# Patient Record
Sex: MALE | Race: OTHER
[De-identification: names, ages, dates, MRNs, and addresses within clinical notes are randomized per-mention and may not be internally consistent; named-entity substitution may affect disease eponyms.]

---

## 2022-04-13 ENCOUNTER — APPOINTMENT (OUTPATIENT)
Dept: ORTHOPEDIC SURGERY | Facility: CLINIC | Age: 57
End: 2022-04-13

## 2022-04-14 ENCOUNTER — APPOINTMENT (OUTPATIENT)
Dept: ORTHOPEDIC SURGERY | Facility: CLINIC | Age: 57
End: 2022-04-14

## 2022-05-19 ENCOUNTER — APPOINTMENT (OUTPATIENT)
Dept: ORTHOPEDIC SURGERY | Facility: CLINIC | Age: 57
End: 2022-05-19
Payer: COMMERCIAL

## 2022-05-19 VITALS — WEIGHT: 150 LBS | BODY MASS INDEX: 24.99 KG/M2 | HEIGHT: 65 IN

## 2022-05-19 DIAGNOSIS — M25.461 EFFUSION, RIGHT KNEE: ICD-10-CM

## 2022-05-19 DIAGNOSIS — Z78.9 OTHER SPECIFIED HEALTH STATUS: ICD-10-CM

## 2022-05-19 DIAGNOSIS — M25.561 PAIN IN RIGHT KNEE: ICD-10-CM

## 2022-05-19 PROCEDURE — 20610 DRAIN/INJ JOINT/BURSA W/O US: CPT

## 2022-05-19 PROCEDURE — 99214 OFFICE O/P EST MOD 30 MIN: CPT | Mod: 25

## 2022-05-19 PROCEDURE — 73560 X-RAY EXAM OF KNEE 1 OR 2: CPT | Mod: RT

## 2022-05-19 PROCEDURE — 73565 X-RAY EXAM OF KNEES: CPT

## 2022-05-19 RX ORDER — CARBIDOPA AND LEVODOPA 25; 100 MG/1; MG/1
25-100 TABLET ORAL
Refills: 0 | Status: ACTIVE | COMMUNITY

## 2022-05-19 RX ORDER — NAPROXEN 500 MG/1
500 TABLET ORAL
Refills: 0 | Status: ACTIVE | COMMUNITY

## 2022-05-19 RX ORDER — SELEGILINE HYDROCHLORIDE 5 MG/1
5 CAPSULE ORAL
Refills: 0 | Status: ACTIVE | COMMUNITY

## 2022-05-19 RX ORDER — AMANTADINE HYDROCHLORIDE 100 1/1
100 TABLET ORAL
Refills: 0 | Status: ACTIVE | COMMUNITY

## 2022-05-19 RX ORDER — DICLOFENAC SODIUM 75 MG/1
75 TABLET, DELAYED RELEASE ORAL
Qty: 60 | Refills: 1 | Status: ACTIVE | COMMUNITY
Start: 2022-05-19 | End: 1900-01-01

## 2022-05-19 NOTE — PROCEDURE
[Large Joint Injection] : Large joint injection [Right] : of the right [Knee] : knee [FreeTextEntry3] : Large Joint Injection was performed because of pain and inflammation. Anesthesia: ethyl chloride sprayed topically.. aspirated 30 cc clear yellow fluid\par Decadron: An injection of Decadron 4 mg , \par Kenalog: An injection of Kenalog 5 mg , \par Lidocaine: 2 cc. \par \par Medication was injected in the right knee. Patient has tried OTC's including aspirin, Ibuprofen, Aleve etc or prescription NSAIDS, and/or exercises at home and/ or physical therapy without satisfactory response and Patient has decreased mobility in the joint. After verbal consent using sterile preparation and technique. The risks, benefits, and alternatives to cortisone injection were explained in full to the patient. Risks outlined include but are not limited to infection, sepsis, bleeding, scarring, skin discoloration, temporary increase in pain, syncopal episode, failure to resolve symptoms, allergic reaction, symptom recurrence, and elevation of blood sugar in diabetics. Patient understood the risks. All questions were answered. After discussion of options, patient requested an injection. Oral informed consent was obtained and sterile prep was done of the injection site. Sterile technique was utilized for the procedure including the preparation of the solutions used for the injection. Patient tolerated the procedure well. Advised to ice the injection site this evening. Prep with betadine locally to site. Sterile technique used and Prep with alcohol locally to site. Sterile technique used. Patient tolerated procedure well. Post Procedure Instructions: Patient was advised to call if redness, pain, or fever occur and apply ice for 15 min. out of every hour for the next 12-24 hours as tolerated. patient was advised to rest the joint(s) for 1-2 days.\par \par

## 2022-05-19 NOTE — HISTORY OF PRESENT ILLNESS
[de-identified] : Right knee pain increased and for 3 months of increased pain and some swelling. no new injuries or falls. . Orthovisc in fall helpped for a few weeks. effects sleep . pain on stairs and more going up. Had no knee surgery had gels in past .  works in [employer] . no injuries.

## 2022-05-19 NOTE — PHYSICAL EXAM
[Lateral] : lateral [[place]] : skyline [AP Standing] : anteroposterior standing [advanced tricompartmental OA with medial compartment narrowing and varus alignment] : advanced tricompartmental OA with medial compartment narrowing and varus alignment [Mild patellofemoral OA] : Mild patellofemoral OA [Left] : left knee [There are no fractures, subluxations or dislocations. No significant abnormalities are seen] : There are no fractures, subluxations or dislocations. No significant abnormalities are seen [Right] : right knee [NL (0)] : extension 0 degrees [5___] : hamstring 5[unfilled]/5 [Negative] : negative Fran's [] : negative Valgus instability [TWNoteComboBox7] : flexion 125 degrees

## 2022-05-19 NOTE — DISCUSSION/SUMMARY
[Medication Risks Reviewed] : Medication risks reviewed [Surgical risks reviewed] : Surgical risks reviewed [de-identified] : discussed at length the need for a total knee replacement. 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 alternatives of trying again orthovisc but last series did not help much. all discussed.\par He is going away in June and does know if cortisone is injected today he cannot have a total knee for 3 months. He and his wife understand.

## 2022-06-09 ENCOUNTER — APPOINTMENT (OUTPATIENT)
Dept: ORTHOPEDIC SURGERY | Facility: CLINIC | Age: 57
End: 2022-06-09
Payer: COMMERCIAL

## 2022-06-09 DIAGNOSIS — G20 PARKINSON'S DISEASE: ICD-10-CM

## 2022-06-09 DIAGNOSIS — M17.11 UNILATERAL PRIMARY OSTEOARTHRITIS, RIGHT KNEE: ICD-10-CM

## 2022-06-09 PROCEDURE — 99215 OFFICE O/P EST HI 40 MIN: CPT

## 2022-06-09 NOTE — DISCUSSION/SUMMARY
[Medication Risks Reviewed] : Medication risks reviewed [Surgical risks reviewed] : Surgical risks reviewed [de-identified] : discussed at length the need for a total knee replacement. 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 alternatives of trying again orthovisc but last series did not help much. all discussed.\par He is going away in June and does know if cortisone is injected today he cannot have a total knee for 3 months. He and his wife understand.

## 2022-06-09 NOTE — PHYSICAL EXAM
[NL (0)] : extension 0 degrees [5___] : hamstring 5[unfilled]/5 [Negative] : negative Fran's [Right] : right knee [Lateral] : lateral [[place]] : skyline [AP Standing] : anteroposterior standing [advanced tricompartmental OA with medial compartment narrowing and varus alignment] : advanced tricompartmental OA with medial compartment narrowing and varus alignment [Mild patellofemoral OA] : Mild patellofemoral OA [Left] : left knee [There are no fractures, subluxations or dislocations. No significant abnormalities are seen] : There are no fractures, subluxations or dislocations. No significant abnormalities are seen [] : negative Valgus instability [TWNoteComboBox7] : flexion 125 degrees

## 2022-06-09 NOTE — HISTORY OF PRESENT ILLNESS
[de-identified] : Right knee pain increased and for 3 months of increased pain and some swelling. no new injuries or falls. . Orthovisc in fall helpped for a few weeks. effects sleep . pain on stairs and more going up. Had no knee surgery had gels in past .  works in [employer] . no injuries.

## 2022-06-09 NOTE — ASSESSMENT
[FreeTextEntry1] : 56 year M WITH MODERATE RT KNEE PAIN WITHOUT INJURY. HAD INJECTIONS IN THE PAST WHICH HELPED. PAIN WORSENS WITH STAIRS AND WALKING PROLONGED DISTANCES. PAIN IS AFFECTING ADL AND FUNCTIONAL ACTIVITIES. TREATMENT OPTIONS REVIEWED. DISCUSSED TKA AT LENGTH. HE WILL GET CLEARANCE FROM CARDIOLOGY/NEUROLOGY.\par \par NO METAL ALLERGIES\par PMHx: PARKINSON'S DISEASE\par NO DVT/PE\par NO H/O DM, HTN \par \par \par RT TKA - CONFORMIS PROTOCOL\par \par DISCUSSED R&B OF CONFORMIS TKA. WILL ORDER CT TO EVAL FOR BONE LOSS AND DEFORMITY FOR PRE-OP PLANNING. \par \par \par \par The patient was advised of the diagnosis. The natural history of the pathology was  explained in full to the patient in layman's terms. All questions were answered. The risks\par and benefits of surgical and non-surgical treatment alternatives were explained in full the patient. \par \par The risks of the procedure, including but not limited to infection, bleeding, anesthetic complication, neurovas injury and the possibility of subsequent surgery were discussed the benefits, non-operative alternatives and expectations of the proposed procedure were also discussed. Post-operative management, the procedure its\par and the expected recovery period were discussed at length with the patient. The need for post-operative physical therapy and home exercise regimen, possible bracing and medication management were also discuss Informed consent was obtained.\par \par The risks and benefits of surgery have been discussed. Risks include but are not limited to bleeding, infection, reaction to anesthesia, injury to blood vessels and nerves, malunion, nonunion, DVT, PE, necessity of repeat surgery chronic pain, loss of limb and death. The patient understands the risks and agrees with the surgical plan. All questions have been answered.\par \par We discussed my findings and the natural history of their condition. We talked about the details of the proposed surgery and the recovery. We discussed the material risks, possible benefits and alternatives to surgery. The risks include but are not limited to infection, bleeding and possible need for blood transfusion, fracture, bowel blockage, bladder retention or infection, need for reoperation, stiffness and/or limited range of motion, possible damage to nerves and blood vessels, failure of fixation of components, risk of deep vein thromboses and pulmonary embolism, wound healing problems, dislocation, and possible leg length discrepancy. Although incredibly rare, we also discussed the risks of a cardiac event, stroke and even death during, or following, the surgery. We discussed the type of implants the patient will be receiving and the type of fixation that will be used, as well as whether a robot or computer navigation aide will be used. The patient understands they will need medical clearance and will attend a preoperative joint education class. We also discussed the type of anesthesia they will receive, and the risks associated with hospital or rehab length of stay, obesity, diabetes and smoking.\par \par Patient Complains of pain in the affected joint with a level that often reaches greater than a 8/10. The Pain has been progressively worsening of his/her treatment coarse. The pain has interfered with their ADLs and worsens with weight bearing. On exam they often have episodes of swelling/effusion with limited ROM. Pain worsens with ROM passive and active and I can palpate crepitus. \par X- rays were reviewed with the patient and they show joint space narrowing, subchondral sclerosis, osteophyte formation, and subchondral cysts.\par After a period of more than 12 weeks physical therapy or exercise program done with me or another treating physician they have continued pain. The patient has failed a trial of NSAID medication or pain relieves if they were unable to tolerate NSAID medications as well as a series of injection, steroid or Hyaluronic Acid. After a long discussion with the patient both the patient and I have decided we have exhausted all forms of less radical treatments and they would like to proceed with Total Joint Replacement.

## 2022-07-18 ENCOUNTER — FORM ENCOUNTER (OUTPATIENT)
Age: 57
End: 2022-07-18

## 2022-09-08 ENCOUNTER — OUTPATIENT (OUTPATIENT)
Dept: OUTPATIENT SERVICES | Facility: HOSPITAL | Age: 57
LOS: 1 days | Discharge: HOME | End: 2022-09-08

## 2022-09-08 VITALS
WEIGHT: 137.35 LBS | SYSTOLIC BLOOD PRESSURE: 131 MMHG | HEIGHT: 65 IN | DIASTOLIC BLOOD PRESSURE: 88 MMHG | HEART RATE: 71 BPM | OXYGEN SATURATION: 99 % | RESPIRATION RATE: 17 BRPM | TEMPERATURE: 98 F

## 2022-09-08 DIAGNOSIS — M17.11 UNILATERAL PRIMARY OSTEOARTHRITIS, RIGHT KNEE: ICD-10-CM

## 2022-09-08 DIAGNOSIS — Z86.69 PERSONAL HISTORY OF OTHER DISEASES OF THE NERVOUS SYSTEM AND SENSE ORGANS: ICD-10-CM

## 2022-09-08 DIAGNOSIS — Z01.818 ENCOUNTER FOR OTHER PREPROCEDURAL EXAMINATION: ICD-10-CM

## 2022-09-08 DIAGNOSIS — Z96.89 PRESENCE OF OTHER SPECIFIED FUNCTIONAL IMPLANTS: Chronic | ICD-10-CM

## 2022-09-08 LAB
A1C WITH ESTIMATED AVERAGE GLUCOSE RESULT: 5.5 % — SIGNIFICANT CHANGE UP (ref 4–5.6)
ALBUMIN SERPL ELPH-MCNC: 4.2 G/DL — SIGNIFICANT CHANGE UP (ref 3.3–5)
ALP SERPL-CCNC: 79 U/L — SIGNIFICANT CHANGE UP (ref 40–120)
ALT FLD-CCNC: 20 U/L — SIGNIFICANT CHANGE UP (ref 12–78)
ANION GAP SERPL CALC-SCNC: 4 MMOL/L — LOW (ref 5–17)
APTT BLD: 32.8 SEC — SIGNIFICANT CHANGE UP (ref 27.5–35.5)
AST SERPL-CCNC: 21 U/L — SIGNIFICANT CHANGE UP (ref 15–37)
BASOPHILS # BLD AUTO: 0.06 K/UL — SIGNIFICANT CHANGE UP (ref 0–0.2)
BASOPHILS NFR BLD AUTO: 0.9 % — SIGNIFICANT CHANGE UP (ref 0–2)
BILIRUB SERPL-MCNC: 0.5 MG/DL — SIGNIFICANT CHANGE UP (ref 0.2–1.2)
BLD GP AB SCN SERPL QL: SIGNIFICANT CHANGE UP
BUN SERPL-MCNC: 22 MG/DL — SIGNIFICANT CHANGE UP (ref 7–23)
CALCIUM SERPL-MCNC: 9.4 MG/DL — SIGNIFICANT CHANGE UP (ref 8.5–10.1)
CHLORIDE SERPL-SCNC: 108 MMOL/L — SIGNIFICANT CHANGE UP (ref 96–108)
CO2 SERPL-SCNC: 28 MMOL/L — SIGNIFICANT CHANGE UP (ref 22–31)
CREAT SERPL-MCNC: 0.95 MG/DL — SIGNIFICANT CHANGE UP (ref 0.5–1.3)
EGFR: 95 ML/MIN/1.73M2 — SIGNIFICANT CHANGE UP
EOSINOPHIL # BLD AUTO: 0.22 K/UL — SIGNIFICANT CHANGE UP (ref 0–0.5)
EOSINOPHIL NFR BLD AUTO: 3.1 % — SIGNIFICANT CHANGE UP (ref 0–6)
ESTIMATED AVERAGE GLUCOSE: 111 MG/DL — SIGNIFICANT CHANGE UP (ref 68–114)
GLUCOSE SERPL-MCNC: 85 MG/DL — SIGNIFICANT CHANGE UP (ref 70–99)
HCT VFR BLD CALC: 49 % — SIGNIFICANT CHANGE UP (ref 39–50)
HGB BLD-MCNC: 16.1 G/DL — SIGNIFICANT CHANGE UP (ref 13–17)
IMM GRANULOCYTES NFR BLD AUTO: 0.3 % — SIGNIFICANT CHANGE UP (ref 0–1.5)
INR BLD: 1.01 RATIO — SIGNIFICANT CHANGE UP (ref 0.88–1.16)
LYMPHOCYTES # BLD AUTO: 1.49 K/UL — SIGNIFICANT CHANGE UP (ref 1–3.3)
LYMPHOCYTES # BLD AUTO: 21.2 % — SIGNIFICANT CHANGE UP (ref 13–44)
MCHC RBC-ENTMCNC: 31.1 PG — SIGNIFICANT CHANGE UP (ref 27–34)
MCHC RBC-ENTMCNC: 32.9 G/DL — SIGNIFICANT CHANGE UP (ref 32–36)
MCV RBC AUTO: 94.6 FL — SIGNIFICANT CHANGE UP (ref 80–100)
MONOCYTES # BLD AUTO: 0.59 K/UL — SIGNIFICANT CHANGE UP (ref 0–0.9)
MONOCYTES NFR BLD AUTO: 8.4 % — SIGNIFICANT CHANGE UP (ref 2–14)
MRSA PCR RESULT.: SIGNIFICANT CHANGE UP
NEUTROPHILS # BLD AUTO: 4.65 K/UL — SIGNIFICANT CHANGE UP (ref 1.8–7.4)
NEUTROPHILS NFR BLD AUTO: 66.1 % — SIGNIFICANT CHANGE UP (ref 43–77)
NRBC # BLD: 0 /100 WBCS — SIGNIFICANT CHANGE UP (ref 0–0)
PLATELET # BLD AUTO: 295 K/UL — SIGNIFICANT CHANGE UP (ref 150–400)
POTASSIUM SERPL-MCNC: 3.7 MMOL/L — SIGNIFICANT CHANGE UP (ref 3.5–5.3)
POTASSIUM SERPL-SCNC: 3.7 MMOL/L — SIGNIFICANT CHANGE UP (ref 3.5–5.3)
PROT SERPL-MCNC: 7.6 GM/DL — SIGNIFICANT CHANGE UP (ref 6–8.3)
PROTHROM AB SERPL-ACNC: 12 SEC — SIGNIFICANT CHANGE UP (ref 10.5–13.4)
RBC # BLD: 5.18 M/UL — SIGNIFICANT CHANGE UP (ref 4.2–5.8)
RBC # FLD: 13.2 % — SIGNIFICANT CHANGE UP (ref 10.3–14.5)
S AUREUS DNA NOSE QL NAA+PROBE: SIGNIFICANT CHANGE UP
SODIUM SERPL-SCNC: 140 MMOL/L — SIGNIFICANT CHANGE UP (ref 135–145)
WBC # BLD: 7.03 K/UL — SIGNIFICANT CHANGE UP (ref 3.8–10.5)
WBC # FLD AUTO: 7.03 K/UL — SIGNIFICANT CHANGE UP (ref 3.8–10.5)

## 2022-09-08 NOTE — H&P PST ADULT - PROBLEM SELECTOR PLAN 2
Deep brain stimulator in place  on Sinemet, Selegiline, Amantadine  Continue current regimen and medications.   neuro clearance

## 2022-09-08 NOTE — H&P PST ADULT - PROBLEM SELECTOR PLAN 1
Right total knee replacement  labs - cbc,pt/ptt,bmp,t&s,nose cx,ekg  M/C required  cardiac clearance  preop 3 day hibiclens instruction reviewed and given .instructed on if  nose cx positive use mupuricin 5 days and checklist given  take routine meds DOS with sips of water. avoid NSAID and OTC supplements. verbalized understanding  information on proper nutrition , increase protein and better food choices provided in packet   ensure clear given  Anesthesiologist to review PST labs, EKG, required clearances and optimization for surgery.

## 2022-09-08 NOTE — H&P PST ADULT - NEGATIVE CARDIOVASCULAR SYMPTOMS
BIPAP ordered by Dr. Soto in ER. Upon placing pt on BIPAP, small mask was noted to be somewhat too small with inability to maintain good seal due to pt having full face beard. Medium mask was tried on pt with result of being too large and also unable to maintain a good seal. Pt leak with both small and medium mask was >100 L/min. Dr. Boss paged to ask if we may try Vapotherm in place of BIPAP.    no chest pain/no palpitations/no dyspnea on exertion

## 2022-09-08 NOTE — OCCUPATIONAL THERAPY INITIAL EVALUATION ADULT - ADDITIONAL COMMENTS
Pt lives with spouse (Who can assist post op) in a private house with 6 steps to enter with bilateral handrails (Far apart). Once inside, the pt has 3 steps (R rail) to a landing and then 12 steps (R rail) to reach main floor where bedroom and bathroom is. The pts bathroom has a tub/shower combination, fixed/retractable shower head, standard toilet seat and no grab bars. The pt reports that a 3/1 commode can fit over the toilet at home. The pt ambulates with no device and reports that he has a standard cane at home from his wife. The pt daily pain is a 6/10 at rest and a 8/10 with movement. The pt manages the pain with rest, topical cream, Naprosyn, Mobic and Motrin. The pt has no recent outpatient PT, no hx of falls and has no buckling of the knees. The pt wears glasses for driving, R handed, drives and has no hearing impairments.

## 2022-09-08 NOTE — H&P PST ADULT - HISTORY OF PRESENT ILLNESS
56M pmh parkinson's (Deep brain stimulator in place followed by neuro) c/o right knee pain 2/2 unilateral primary osteoarthritis here for PST for scheduled Right total knee replacement  This patient denies any fever, cough, sob, flu like symptoms or travel outside of the [place] in the past 30 days

## 2022-09-08 NOTE — H&P PST ADULT - NSANTHOSAYNRD_GEN_A_CORE
No. OK screening performed.  STOP BANG Legend: 0-2 = LOW Risk; 3-4 = INTERMEDIATE Risk; 5-8 = HIGH Risk

## 2022-09-08 NOTE — PHYSICAL THERAPY INITIAL EVALUATION ADULT - GAIT DEVIATIONS NOTED, PT EVAL
antalgic gait/decreased cassidy/decreased velocity of limb motion/decreased step length/decreased stride length

## 2022-09-08 NOTE — PHYSICAL THERAPY INITIAL EVALUATION ADULT - GENERAL OBSERVATIONS, REHAB EVAL
Patient was seen seated  in chair in PT/OT preoperative assessment room with R knee brace donned and no apparent distress.  Wife Lupis present. Patient was seen seated  in chair in PT/OT preoperative assessment room with R knee genu varum and c brace donned and no apparent distress.  Wife Lupis present.

## 2022-09-08 NOTE — H&P PST ADULT - ASSESSMENT
56M pmh parkinson's (Deep brain stimulator in place followed by neuro) c/o right knee pain 2/2 unilateral primary osteoarthritis here for PST for scheduled Right total knee replacement  CAPRINI SCORE [CLOT]    AGE RELATED RISK FACTORS                                                       MOBILITY RELATED FACTORS  [ x] Age 41-60 years                                            (1 Point)                  [ ] Bed rest                                                        (1 Point)  [ ] Age: 61-74 years                                           (2 Points)                 [ ] Plaster cast                                                   (2 Points)  [ ] Age= 75 years                                              (3 Points)                 [ ] Bed bound for more than 72 hours                 (2 Points)    DISEASE RELATED RISK FACTORS                                               GENDER SPECIFIC FACTORS  [ ] Edema in the lower extremities                       (1 Point)                  [ ] Pregnancy                                                     (1 Point)  [ ] Varicose veins                                               (1 Point)                  [ ] Post-partum < 6 weeks                                   (1 Point)             [x ] BMI > 25 Kg/m2                                            (1 Point)                  [ ] Hormonal therapy  or oral contraception          (1 Point)                 [ ] Sepsis (in the previous month)                        (1 Point)                  [ ] History of pregnancy complications                 (1 point)  [ ] Pneumonia or serious lung disease                                               [ ] Unexplained or recurrent                     (1 Point)           (in the previous month)                               (1 Point)  [ ] Abnormal pulmonary function test                     (1 Point)                 SURGERY RELATED RISK FACTORS  [ ] Acute myocardial infarction                              (1 Point)                 [ ]  Section                                             (1 Point)  [ ] Congestive heart failure (in the previous month)  (1 Point)               [ ] Minor surgery                                                  (1 Point)   [ ] Inflammatory bowel disease                             (1 Point)                 [ ] Arthroscopic surgery                                        (2 Points)  [ ] Central venous access                                      (2 Points)                [ ] General surgery lasting more than 45 minutes   (2 Points)       [ ] Stroke (in the previous month)                          (5 Points)               [x ] Elective arthroplasty                                         (5 Points)                                                                                                                                               HEMATOLOGY RELATED FACTORS                                                 TRAUMA RELATED RISK FACTORS  [ ] Prior episodes of VTE                                     (3 Points)                [ ] Fracture of the hip, pelvis, or leg                       (5 Points)  [ ] Positive family history for VTE                         (3 Points)                 [ ] Acute spinal cord injury (in the previous month)  (5 Points)  [ ] Prothrombin 56155 A                                     (3 Points)                 [ ] Paralysis  (less than 1 month)                             (5 Points)  [ ] Factor V Leiden                                             (3 Points)                  [ ] Multiple Trauma within 1 month                        (5 Points)  [ ] Lupus anticoagulants                                     (3 Points)                                                           [ ] Anticardiolipin antibodies                               (3 Points)                                                       [ ] High homocysteine in the blood                      (3 Points)                                             [ ] Other congenital or acquired thrombophilia      (3 Points)                                                [ ] Heparin induced thrombocytopenia                  (3 Points)                                          Total Score [       9   ]    Caprini Score 0 - 2:  Low Risk, No VTE Prophylaxis required for most patients, encourage ambulation  Caprini Score 3 - 6:  At Risk, pharmacologic VTE prophylaxis is indicated for most patients (in the absence of a contraindication)  Caprini Score Greater than or = 7:  High Risk, pharmacologic VTE prophylaxis is indicated for most patients (in the absence of a contraindication)

## 2022-09-08 NOTE — PHYSICAL THERAPY INITIAL EVALUATION ADULT - ADDITIONAL COMMENTS
As per pt  and wife: There are 6 steps, c far kanwal rails,  at the entry of the house and 3 steps, c R rail up, followed by a landing and another 12 steps, c R rail up, to negotiate at home. Pt has a tub/shower combo c fixed shower head, no grab bar, and regular toilet seat  in BR. 3-1 commode will fit in BR. Average pain level at rest is 6/10. Pain increases to 8/10 c bending, squat, and stairs negotiation. Pt take Naproxen, Motrin, Mobic, and apply topical ointment and perform stretch for pain management. Pt has no experience of adverse effects with pain medication. Pt is not on out-pt physical therapy at present. There is no h/o fall or knee buckling recently. Pt wears glasses for driving and at night and no need for hearing aid. Pt is R handed and drives.

## 2022-09-08 NOTE — PHYSICAL THERAPY INITIAL EVALUATION ADULT - PATIENT PROFILE REVIEW, REHAB EVAL
Nursing D/C note:  Stable at time of D/C.  See D/C form for F/U recommendations.  Will send home D/C form.   yes

## 2022-09-08 NOTE — PHYSICAL THERAPY INITIAL EVALUATION ADULT - PERTINENT HX OF CURRENT PROBLEM, REHAB EVAL
R knee OA c chronic pain and  limiting functional mobility. Pt is scheduled for R knee elective total joint replacement on 9/28/22  by  Dr. Edwards.

## 2022-09-08 NOTE — H&P PST ADULT - NSICDXPASTMEDICALHX_GEN_ALL_CORE_FT
PAST MEDICAL HISTORY:  H/O seasonal allergies     History of Parkinson's disease Early onset 2011

## 2022-09-08 NOTE — OCCUPATIONAL THERAPY INITIAL EVALUATION ADULT - PERTINENT HX OF CURRENT PROBLEM, REHAB EVAL
R knee OA which impacts pts ability to perform functional tasks/transfers and mobility. Pt is scheduled for R TKR on 09/28/22.

## 2022-09-10 LAB — VIT D25+D1,25 OH+D1,25 PNL SERPL-MCNC: 65.8 PG/ML — SIGNIFICANT CHANGE UP (ref 19.9–79.3)

## 2022-09-13 LAB
MRSA PCR RESULT.: SIGNIFICANT CHANGE UP
S AUREUS DNA NOSE QL NAA+PROBE: SIGNIFICANT CHANGE UP

## 2022-09-27 ENCOUNTER — FORM ENCOUNTER (OUTPATIENT)
Age: 57
End: 2022-09-27

## 2022-09-27 RX ORDER — CELECOXIB 200 MG/1
200 CAPSULE ORAL EVERY 12 HOURS
Refills: 0 | Status: DISCONTINUED | OUTPATIENT
Start: 2022-09-29 | End: 2022-09-29

## 2022-09-27 RX ORDER — AMANTADINE HCL 100 MG
100 CAPSULE ORAL
Refills: 0 | Status: DISCONTINUED | OUTPATIENT
Start: 2022-09-28 | End: 2022-09-29

## 2022-09-27 RX ORDER — POLYETHYLENE GLYCOL 3350 17 G/17G
17 POWDER, FOR SOLUTION ORAL AT BEDTIME
Refills: 0 | Status: DISCONTINUED | OUTPATIENT
Start: 2022-09-28 | End: 2022-09-29

## 2022-09-27 RX ORDER — ACETAMINOPHEN 500 MG
975 TABLET ORAL EVERY 8 HOURS
Refills: 0 | Status: DISCONTINUED | OUTPATIENT
Start: 2022-09-28 | End: 2022-09-29

## 2022-09-27 RX ORDER — LANOLIN ALCOHOL/MO/W.PET/CERES
3 CREAM (GRAM) TOPICAL AT BEDTIME
Refills: 0 | Status: DISCONTINUED | OUTPATIENT
Start: 2022-09-28 | End: 2022-09-29

## 2022-09-27 RX ORDER — MAGNESIUM HYDROXIDE 400 MG/1
30 TABLET, CHEWABLE ORAL DAILY
Refills: 0 | Status: DISCONTINUED | OUTPATIENT
Start: 2022-09-28 | End: 2022-09-29

## 2022-09-27 RX ORDER — SENNA PLUS 8.6 MG/1
2 TABLET ORAL AT BEDTIME
Refills: 0 | Status: DISCONTINUED | OUTPATIENT
Start: 2022-09-28 | End: 2022-09-29

## 2022-09-27 RX ORDER — HYDROMORPHONE HYDROCHLORIDE 2 MG/ML
0.5 INJECTION INTRAMUSCULAR; INTRAVENOUS; SUBCUTANEOUS EVERY 4 HOURS
Refills: 0 | Status: DISCONTINUED | OUTPATIENT
Start: 2022-09-28 | End: 2022-09-29

## 2022-09-27 RX ORDER — ASCORBIC ACID 60 MG
500 TABLET,CHEWABLE ORAL
Refills: 0 | Status: DISCONTINUED | OUTPATIENT
Start: 2022-09-28 | End: 2022-09-29

## 2022-09-27 RX ORDER — OXYCODONE HYDROCHLORIDE 5 MG/1
5 TABLET ORAL
Refills: 0 | Status: DISCONTINUED | OUTPATIENT
Start: 2022-09-28 | End: 2022-09-29

## 2022-09-27 RX ORDER — SODIUM CHLORIDE 9 MG/ML
1000 INJECTION, SOLUTION INTRAVENOUS
Refills: 0 | Status: DISCONTINUED | OUTPATIENT
Start: 2022-09-28 | End: 2022-09-29

## 2022-09-27 RX ORDER — ONDANSETRON 8 MG/1
4 TABLET, FILM COATED ORAL EVERY 6 HOURS
Refills: 0 | Status: DISCONTINUED | OUTPATIENT
Start: 2022-09-28 | End: 2022-09-29

## 2022-09-27 RX ORDER — ACETAMINOPHEN 500 MG
1000 TABLET ORAL ONCE
Refills: 0 | Status: DISCONTINUED | OUTPATIENT
Start: 2022-09-28 | End: 2022-09-29

## 2022-09-27 RX ORDER — OXYCODONE HYDROCHLORIDE 5 MG/1
10 TABLET ORAL
Refills: 0 | Status: DISCONTINUED | OUTPATIENT
Start: 2022-09-28 | End: 2022-09-29

## 2022-09-27 RX ORDER — ASPIRIN/CALCIUM CARB/MAGNESIUM 324 MG
81 TABLET ORAL
Refills: 0 | Status: DISCONTINUED | OUTPATIENT
Start: 2022-09-29 | End: 2022-09-29

## 2022-09-27 RX ORDER — CARBIDOPA AND LEVODOPA 25; 100 MG/1; MG/1
0.5 TABLET ORAL
Refills: 0 | Status: DISCONTINUED | OUTPATIENT
Start: 2022-09-28 | End: 2022-09-29

## 2022-09-27 RX ORDER — PANTOPRAZOLE SODIUM 20 MG/1
40 TABLET, DELAYED RELEASE ORAL
Refills: 0 | Status: DISCONTINUED | OUTPATIENT
Start: 2022-09-28 | End: 2022-09-29

## 2022-09-28 ENCOUNTER — TRANSCRIPTION ENCOUNTER (OUTPATIENT)
Age: 57
End: 2022-09-28

## 2022-09-28 ENCOUNTER — APPOINTMENT (OUTPATIENT)
Dept: ORTHOPEDIC SURGERY | Facility: HOSPITAL | Age: 57
End: 2022-09-28

## 2022-09-28 ENCOUNTER — INPATIENT (INPATIENT)
Facility: HOSPITAL | Age: 57
LOS: 0 days | Discharge: HOME | End: 2022-09-29
Attending: ORTHOPAEDIC SURGERY | Admitting: ORTHOPAEDIC SURGERY

## 2022-09-28 VITALS
WEIGHT: 137.35 LBS | DIASTOLIC BLOOD PRESSURE: 92 MMHG | SYSTOLIC BLOOD PRESSURE: 143 MMHG | HEART RATE: 71 BPM | OXYGEN SATURATION: 100 % | HEIGHT: 65 IN | TEMPERATURE: 98 F | RESPIRATION RATE: 18 BRPM

## 2022-09-28 DIAGNOSIS — Z96.89 PRESENCE OF OTHER SPECIFIED FUNCTIONAL IMPLANTS: Chronic | ICD-10-CM

## 2022-09-28 LAB
ANION GAP SERPL CALC-SCNC: 6 MMOL/L — SIGNIFICANT CHANGE UP (ref 5–17)
BUN SERPL-MCNC: 14 MG/DL — SIGNIFICANT CHANGE UP (ref 7–23)
CALCIUM SERPL-MCNC: 8.8 MG/DL — SIGNIFICANT CHANGE UP (ref 8.5–10.1)
CHLORIDE SERPL-SCNC: 107 MMOL/L — SIGNIFICANT CHANGE UP (ref 96–108)
CO2 SERPL-SCNC: 23 MMOL/L — SIGNIFICANT CHANGE UP (ref 22–31)
CREAT SERPL-MCNC: 0.97 MG/DL — SIGNIFICANT CHANGE UP (ref 0.5–1.3)
EGFR: 92 ML/MIN/1.73M2 — SIGNIFICANT CHANGE UP
GLUCOSE SERPL-MCNC: 102 MG/DL — HIGH (ref 70–99)
HCT VFR BLD CALC: 48.2 % — SIGNIFICANT CHANGE UP (ref 39–50)
HGB BLD-MCNC: 15.5 G/DL — SIGNIFICANT CHANGE UP (ref 13–17)
MCHC RBC-ENTMCNC: 30.9 PG — SIGNIFICANT CHANGE UP (ref 27–34)
MCHC RBC-ENTMCNC: 32.2 G/DL — SIGNIFICANT CHANGE UP (ref 32–36)
MCV RBC AUTO: 96 FL — SIGNIFICANT CHANGE UP (ref 80–100)
NRBC # BLD: 0 /100 WBCS — SIGNIFICANT CHANGE UP (ref 0–0)
PLATELET # BLD AUTO: 333 K/UL — SIGNIFICANT CHANGE UP (ref 150–400)
POTASSIUM SERPL-MCNC: 4.4 MMOL/L — SIGNIFICANT CHANGE UP (ref 3.5–5.3)
POTASSIUM SERPL-SCNC: 4.4 MMOL/L — SIGNIFICANT CHANGE UP (ref 3.5–5.3)
RBC # BLD: 5.02 M/UL — SIGNIFICANT CHANGE UP (ref 4.2–5.8)
RBC # FLD: 12.9 % — SIGNIFICANT CHANGE UP (ref 10.3–14.5)
SODIUM SERPL-SCNC: 136 MMOL/L — SIGNIFICANT CHANGE UP (ref 135–145)
WBC # BLD: 14.73 K/UL — HIGH (ref 3.8–10.5)
WBC # FLD AUTO: 14.73 K/UL — HIGH (ref 3.8–10.5)

## 2022-09-28 PROCEDURE — 27447 TOTAL KNEE ARTHROPLASTY: CPT | Mod: RT

## 2022-09-28 PROCEDURE — 73560 X-RAY EXAM OF KNEE 1 OR 2: CPT | Mod: 26,RT

## 2022-09-28 PROCEDURE — 27447 TOTAL KNEE ARTHROPLASTY: CPT | Mod: AS,RT

## 2022-09-28 PROCEDURE — 20610 DRAIN/INJ JOINT/BURSA W/O US: CPT | Mod: 59,RT

## 2022-09-28 DEVICE — INSERT ITOTAL IDENTITY CR FULL KIT PRICE 2 PC IPOLY XE: Type: IMPLANTABLE DEVICE | Site: RIGHT | Status: FUNCTIONAL

## 2022-09-28 DEVICE — CEMENT SIMPLEX P 40GM: Type: IMPLANTABLE DEVICE | Site: RIGHT | Status: FUNCTIONAL

## 2022-09-28 RX ORDER — HYDROMORPHONE HYDROCHLORIDE 2 MG/ML
0.5 INJECTION INTRAMUSCULAR; INTRAVENOUS; SUBCUTANEOUS
Refills: 0 | Status: DISCONTINUED | OUTPATIENT
Start: 2022-09-28 | End: 2022-09-28

## 2022-09-28 RX ORDER — SELEGILINE HYDROCHLORIDE 1.25 MG/1
5 TABLET, ORALLY DISINTEGRATING ORAL
Refills: 0 | Status: DISCONTINUED | OUTPATIENT
Start: 2022-09-28 | End: 2022-09-28

## 2022-09-28 RX ORDER — CEFAZOLIN SODIUM 1 G
2000 VIAL (EA) INJECTION EVERY 8 HOURS
Refills: 0 | Status: COMPLETED | OUTPATIENT
Start: 2022-09-28 | End: 2022-09-29

## 2022-09-28 RX ORDER — CARBIDOPA AND LEVODOPA 25; 100 MG/1; MG/1
0.5 TABLET ORAL
Qty: 0 | Refills: 0 | DISCHARGE

## 2022-09-28 RX ORDER — CELECOXIB 200 MG/1
200 CAPSULE ORAL ONCE
Refills: 0 | Status: COMPLETED | OUTPATIENT
Start: 2022-09-28 | End: 2022-09-28

## 2022-09-28 RX ORDER — ONDANSETRON 8 MG/1
4 TABLET, FILM COATED ORAL ONCE
Refills: 0 | Status: DISCONTINUED | OUTPATIENT
Start: 2022-09-28 | End: 2022-09-28

## 2022-09-28 RX ORDER — SODIUM CHLORIDE 9 MG/ML
3 INJECTION INTRAMUSCULAR; INTRAVENOUS; SUBCUTANEOUS EVERY 8 HOURS
Refills: 0 | Status: DISCONTINUED | OUTPATIENT
Start: 2022-09-28 | End: 2022-09-28

## 2022-09-28 RX ORDER — SELEGILINE HYDROCHLORIDE 1.25 MG/1
1 TABLET, ORALLY DISINTEGRATING ORAL
Qty: 0 | Refills: 0 | DISCHARGE

## 2022-09-28 RX ORDER — SELEGILINE HYDROCHLORIDE 1.25 MG/1
5 TABLET, ORALLY DISINTEGRATING ORAL
Refills: 0 | Status: DISCONTINUED | OUTPATIENT
Start: 2022-09-28 | End: 2022-09-29

## 2022-09-28 RX ORDER — SODIUM CHLORIDE 9 MG/ML
1000 INJECTION, SOLUTION INTRAVENOUS
Refills: 0 | Status: DISCONTINUED | OUTPATIENT
Start: 2022-09-28 | End: 2022-09-28

## 2022-09-28 RX ORDER — DEXAMETHASONE 0.5 MG/5ML
10 ELIXIR ORAL ONCE
Refills: 0 | Status: COMPLETED | OUTPATIENT
Start: 2022-09-29 | End: 2022-09-29

## 2022-09-28 RX ORDER — AMANTADINE HCL 100 MG
1 CAPSULE ORAL
Qty: 0 | Refills: 0 | DISCHARGE

## 2022-09-28 RX ORDER — ACETAMINOPHEN 500 MG
650 TABLET ORAL ONCE
Refills: 0 | Status: COMPLETED | OUTPATIENT
Start: 2022-09-28 | End: 2022-09-28

## 2022-09-28 RX ADMIN — CARBIDOPA AND LEVODOPA 0.5 TABLET(S): 25; 100 TABLET ORAL at 19:54

## 2022-09-28 RX ADMIN — OXYCODONE HYDROCHLORIDE 5 MILLIGRAM(S): 5 TABLET ORAL at 17:22

## 2022-09-28 RX ADMIN — SODIUM CHLORIDE 3 MILLILITER(S): 9 INJECTION INTRAMUSCULAR; INTRAVENOUS; SUBCUTANEOUS at 09:07

## 2022-09-28 RX ADMIN — SODIUM CHLORIDE 125 MILLILITER(S): 9 INJECTION, SOLUTION INTRAVENOUS at 23:02

## 2022-09-28 RX ADMIN — CARBIDOPA AND LEVODOPA 0.5 TABLET(S): 25; 100 TABLET ORAL at 17:13

## 2022-09-28 RX ADMIN — Medication 100 MILLIGRAM(S): at 17:12

## 2022-09-28 RX ADMIN — Medication 500 MILLIGRAM(S): at 17:12

## 2022-09-28 RX ADMIN — SODIUM CHLORIDE 125 MILLILITER(S): 9 INJECTION, SOLUTION INTRAVENOUS at 16:22

## 2022-09-28 RX ADMIN — SODIUM CHLORIDE 75 MILLILITER(S): 9 INJECTION, SOLUTION INTRAVENOUS at 14:00

## 2022-09-28 RX ADMIN — ONDANSETRON 4 MILLIGRAM(S): 8 TABLET, FILM COATED ORAL at 20:26

## 2022-09-28 RX ADMIN — OXYCODONE HYDROCHLORIDE 5 MILLIGRAM(S): 5 TABLET ORAL at 16:22

## 2022-09-28 RX ADMIN — CELECOXIB 200 MILLIGRAM(S): 200 CAPSULE ORAL at 09:35

## 2022-09-28 RX ADMIN — Medication 100 MILLIGRAM(S): at 19:43

## 2022-09-28 RX ADMIN — SELEGILINE HYDROCHLORIDE 5 MILLIGRAM(S): 1.25 TABLET, ORALLY DISINTEGRATING ORAL at 17:12

## 2022-09-28 RX ADMIN — Medication 650 MILLIGRAM(S): at 09:35

## 2022-09-28 NOTE — DISCHARGE NOTE NURSING/CASE MANAGEMENT/SOCIAL WORK - PATIENT PORTAL LINK FT
You can access the FollowMyHealth Patient Portal offered by [facility] by registering at the following website: [email or web address]. By joining [facility]’s FollowMyHealth portal, you will also be able to view your health information using other applications (apps) compatible with our system.

## 2022-09-28 NOTE — ASU PREOP CHECKLIST - NS PREOP CHK TEST_COVID_DT_GEN_ALL_CORE
28-Sep-2022 Rhomboid Transposition Flap Text: The defect edges were debeveled with a #15 scalpel blade.  Given the location of the defect and the proximity to free margins a rhomboid transposition flap was deemed most appropriate.  Using a sterile surgical marker, an appropriate rhomboid flap was drawn incorporating the defect.    The area thus outlined was incised deep to adipose tissue with a #15 scalpel blade.  The skin margins were undermined to an appropriate distance in all directions utilizing iris scissors.

## 2022-09-28 NOTE — DISCHARGE NOTE PROVIDER - HOSPITAL COURSE
56yMale with history of osteoarthritis of right knee presenting for right TKA  by Dr Holger Edwards on  9/28/2022. Risk and benefits of surgery were explained to the patient. The patient understood and agreed to proceed with surgery. Patient underwent the procedure with no intraoperative complications. Pt was brought in stable condition to the PACU. Once stable in PACU, pt was brought to the floor. During hospital stay pt was followed by Medicine,  during this admission. Pt had an uneventful hospital course. Pt is stable for discharge to home on POD#1

## 2022-09-28 NOTE — OCCUPATIONAL THERAPY INITIAL EVALUATION ADULT - RANGE OF MOTION, PT EVAL
Patient will increase AROM in right knee to WFL in order to perform toilet transfer independently within 3 weeks

## 2022-09-28 NOTE — PATIENT PROFILE ADULT - FALL HARM RISK - HARM RISK INTERVENTIONS

## 2022-09-28 NOTE — PHYSICAL THERAPY INITIAL EVALUATION ADULT - GAIT TRAINING, PT EVAL
Pt will be able to ambulate using assistive device up to 200 ft or more, be able to negotiate 15 steps safely observing proper gait, posture and prevent falls.

## 2022-09-28 NOTE — PHYSICAL THERAPY INITIAL EVALUATION ADULT - ACTIVE RANGE OF MOTION EXAMINATION, REHAB EVAL
R knee ext 0* in supine and  90* flex in sitt/bilateral upper extremity Active ROM was WFL (within functional limits)/bilateral  lower extremity Active ROM was WFL (within functional limits)

## 2022-09-28 NOTE — DISCHARGE NOTE PROVIDER - NSDCFUADDINST_GEN_ALL_CORE_FT
Keep knee straight while at rest. Elevate the leg as much as possible ("toes above the nose") to help control swelling. Make sure you get up and take a brief walk every two hours to help with circulation and prevent stiffness. Incentive spirometer 10X/hour. Cryocuff to help with pain/inflammation.   Keep JERMAN Dressing Clean, Dry and Intact. May shower with JERMAN Dressing. Please do not scrub, soak, peel or pick at the JERMAN dressing. No creams, lotions, or oils over dressing. May shower and let water run over dressing, no baths. Pat dry once out of shower. Dressing to be removed in 7 days. If dressing is saturated from border to border - may remove and replace with clean dry dressing.    Shower instructions for JERMAN Dressing: Place battery pack in a water sealed bag (such as a Ziplock bag) and keep battery out of the water.   Alternately you can turn battery pack off (press orange button.) Twist tubing OFF battery pack before entering shower. Once done with showering. Pat dressing dry. Reconnect tubing and twist ON battery pack after you are dry. Then turn battery pack on (press orange button.)

## 2022-09-28 NOTE — DISCHARGE NOTE PROVIDER - CARE PROVIDER_API CALL
Holger Edwards)  Orthopaedic Surgery  [address] [place], [place] [address]  Phone: [phone]  Fax: [phone]  Follow Up Time:

## 2022-09-28 NOTE — CONSULT NOTE ADULT - SUBJECTIVE AND OBJECTIVE BOX
CAROLIN CARTWRIGHT is a 56y Male s/p RIGHT TOTAL KNEE REPLACEMENT      w/ h/o History of Parkinson&#x27;s disease    H/O seasonal allergies      denies any chest pain shortness of breath palpitation dizziness lightheadedness nausea vomiting fever or chills    S/P deep brain stimulator placement        SH: doesnot smoke or drink at this time    No Known Allergies    acetaminophen     Tablet .. 975 milliGRAM(s) Oral every 8 hours PRN  acetaminophen   IVPB .. 1000 milliGRAM(s) IV Intermittent once  amantadine 100 milliGRAM(s) Oral two times a day  ascorbic acid 500 milliGRAM(s) Oral two times a day  carbidopa/levodopa  25/100 0.5 Tablet(s) Oral <User Schedule>  ceFAZolin   IVPB 2000 milliGRAM(s) IV Intermittent every 8 hours  HYDROmorphone  Injectable 0.5 milliGRAM(s) IV Push every 4 hours PRN  lactated ringers. 1000 milliLiter(s) IV Continuous <Continuous>  magnesium hydroxide Suspension 30 milliLiter(s) Oral daily PRN  melatonin 3 milliGRAM(s) Oral at bedtime PRN  multivitamin 1 Tablet(s) Oral daily  ondansetron Injectable 4 milliGRAM(s) IV Push every 6 hours PRN  oxyCODONE    IR 5 milliGRAM(s) Oral every 3 hours PRN  oxyCODONE    IR 10 milliGRAM(s) Oral every 3 hours PRN  pantoprazole    Tablet 40 milliGRAM(s) Oral before breakfast  polyethylene glycol 3350 17 Gram(s) Oral at bedtime  selegiline Oral Tab/Cap 5 milliGRAM(s) Oral two times a day  senna 2 Tablet(s) Oral at bedtime    T(C): 36.7 (09-28-22 @ 19:53), Max: 36.9 (09-28-22 @ 14:29)  HR: 71 (09-28-22 @ 20:05) (60 - 74)  BP: 127/84 (09-28-22 @ 20:05) (113/77 - 144/81)  RR: 17 (09-28-22 @ 19:53) (12 - 20)  SpO2: 96% (09-28-22 @ 20:05) (94% - 100%)  HEENT unremarkable  neck no JVD or bruit  heart normal S1 S2 RRR no gallops or rubs  chest clear to auscultation  abd sof nontender non distended +bs  ext no calf tenderness    A/P   DVT PX  pain control  bowel regimen   wound care as per ortho  GI PX  antiemetics prn  incentive spirometer Coronary artery disease    HLD (hyperlipidemia)    HTN (hypertension)    Obese    Stented coronary artery    Type 2 diabetes mellitus without complication, with long-term current use of insulin

## 2022-09-28 NOTE — OCCUPATIONAL THERAPY INITIAL EVALUATION ADULT - ADDITIONAL COMMENTS
Pre-op confirmed: Pt lives with spouse in a private house with 6 steps to enter with bilateral handrails (Far apart). Once inside, the pt has 3 steps (R rail) to a landing and then 12 steps (R rail) to reach main floor where bedroom and bathroom is. The pts bathroom has a tub/shower combination, fixed/retractable shower head, standard toilet seat and no grab bars. The pt reports that a 3/1 commode can fit over the toilet at home. The pt ambulates with no device and reports that he has a standard cane at home from his wife. The pt wears glasses for driving, R handed, drives and has no hearing impairments.

## 2022-09-28 NOTE — DISCHARGE NOTE NURSING/CASE MANAGEMENT/SOCIAL WORK - NSDCFUADDAPPT_GEN_ALL_CORE_FT
Follow up with your surgeon in two weeks. Call for appointment.  If you need more pain medication, call your surgeon's office. For medication refills or authorizations, please call [phone]  We recommend that you call and schedule a follow up appointment with your primary care physician for repeat blood work (CBC and BMP) for post hospital discharge follow-up care 2-4 weeks after your surgery.   Call your surgeon if you have increased redness/pain/drainage or fever. Return to ER for shortness of breath/calf tenderness.

## 2022-09-28 NOTE — DISCHARGE NOTE PROVIDER - NSDCFUADDAPPT_GEN_ALL_CORE_FT
Follow up with your surgeon in two weeks. Call for appointment.  If you need more pain medication, call your surgeon's office. For medication refills or authorizations, please call [phone]  We recommend that you call and schedule a follow up appointment with your primary care physician for repeat blood work (CBC and BMP) for post hospital discharge follow-up care 2-4 weeks after your surgery.   Call your surgeon if you have increased redness/pain/drainage or fever. Return to ER for shortness of breath/calf tenderness.

## 2022-09-28 NOTE — DISCHARGE NOTE PROVIDER - NSDCDCMDCOMP_GEN_ALL_CORE
Subjective:          Chief Complaint: Elizabeth Betancourt is a 17 y.o. female who had concerns including Post-op Evaluation of the Left Knee.    Elizabeth Betancourt is a 17 y.o. female veronique  at [facility] presents for evaluation of her left knee.  She underwent right quadriceps tendon with bone block ACL reconstruction and lateral meniscus repair on 05/06/2022 by Dr. King.  She is in physical therapy at [facility].  She is doing okay.  She does not have any true pains but does have some lateral-sided stretching sensations.  She has difficulty achieving full range of motion particularly to extension.  They work on this in physical therapy but she cannot achieve it by herself.  She does states she hyperextends on the contralateral right knee.  She states her knee does feel stable.  Denies any instability or mechanical symptoms.  She is still ambulating with the T scope brace despite being 3 months from surgery.  Denies any numbness or paresthesias.      DOS 5/6/2022 with Dr. King  Procedure(s) (LRB):  1. RECONSTRUCTION, KNEE, ACL, ARTHROSCOPIC  with quad tendon autograft.    2. Left knee arthroscopy with lateral meniscus repair    History reviewed. No pertinent past medical history.    Current Outpatient Medications on File Prior to Visit   Medication Sig Dispense Refill    HYDROcodone-acetaminophen (NORCO) 5-325 mg per tablet Take 1 tablet by mouth every 4 (four) hours as needed for Pain. (Patient not taking: No sig reported) 25 tablet 0    ibuprofen (ADVIL,MOTRIN) 400 MG tablet Take 1 tablet (400 mg total) by mouth every 6 (six) hours as needed (pain). (Patient not taking: No sig reported) 20 tablet 0    naproxen (NAPROSYN) 500 MG tablet Take 1 tablet (500 mg total) by mouth 2 (two) times daily with meals. (Patient not taking: No sig reported) 20 tablet 0    pseudoephedrine-DM-guaiFENesin (POLY-VENT DM) 60- mg Tab Take 1 tablet by mouth every 6 (six) hours as needed. (Patient not  taking: No sig reported) 20 tablet 0     No current facility-administered medications on file prior to visit.       Past Surgical History:   Procedure Laterality Date    KNEE ARTHROSCOPY W/ ACL RECONSTRUCTION Left 5/6/2022    Procedure: RECONSTRUCTION, KNEE, ACL, ARTHROSCOPIC (LEFT);  Surgeon: Zaki King MD;  Location: [facility] OR;  Service: Orthopedics;  Laterality: Left;  Quad tendon, possible LET (Linvatec)    REPAIR OF MENISCUS OF KNEE Left 5/6/2022    Procedure: REPAIR, MENISCUS, KNEE (LEFT), lateral;  Surgeon: Zaki King MD;  Location: [facility] OR;  Service: Orthopedics;  Laterality: Left;       Family History   Problem Relation Age of Onset    Diabetes Mother     Stroke Mother     Hypertension Father        Social History     Socioeconomic History    Marital status: Single   Tobacco Use    Smoking status: Never Smoker    Smokeless tobacco: Never Used   Substance and Sexual Activity    Alcohol use: Never    Drug use: Never    Sexual activity: Never       Review of Systems   Constitutional: Negative.   HENT: Negative.    Eyes: Negative.    Cardiovascular: Negative.    Respiratory: Negative.    Endocrine: Negative.    Hematologic/Lymphatic: Negative.    Skin: Negative.    Musculoskeletal: Positive for joint pain, muscle weakness and stiffness. Negative for arthritis, falls, joint swelling, muscle cramps and myalgias.   Neurological: Negative.    Psychiatric/Behavioral: Negative.    Allergic/Immunologic: Negative.                    Objective:        General: Elizabeth is well-developed, well-nourished, appears stated age, in no acute distress, alert and oriented to time, place and person.     General    Nursing note and vitals reviewed.  Constitutional: She is oriented to person, place, and time. She appears well-developed and well-nourished. No distress.   HENT:   Head: Normocephalic and atraumatic.   Nose: Nose normal.   Eyes: EOM are normal.   Cardiovascular: Intact distal pulses.     Pulmonary/Chest: Effort normal. No respiratory distress.   Neurological: She is alert and oriented to person, place, and time.   Psychiatric: She has a normal mood and affect. Her behavior is normal. Judgment and thought content normal.     General Musculoskeletal Exam   Gait: abnormal       Right Knee Exam     Inspection   Erythema: absent  Scars: absent  Swelling: absent  Effusion: absent  Deformity: absent  Bruising: absent    Tenderness   The patient is experiencing no tenderness.     Range of Motion   Extension: -10   Flexion: 140     Tests   Meniscus   Mariela:  Medial - negative Lateral - negative  Ligament Examination Lachman: normal (-1 to 2mm) PCL-Posterior Drawer: normal (0 to 2mm)     MCL - Valgus: normal (0 to 2mm)  LCL - Varus: normal  Patella   Patellar apprehension: negative  Passive Patellar Tilt: neutral  Patellar Tracking: normal  Patellar Glide (quadrants): Lateral - 2   Medial - 1    Other   Sensation: normal    Left Knee Exam     Inspection   Erythema: absent  Scars: present ( previous surgical incisions are well healed.)  Swelling: absent  Effusion: absent  Deformity: present (Flexion contracture)  Bruising: absent    Tenderness   The patient tender to palpation of the medial joint line, lateral joint line and patella.    Crepitus   The patient has crepitus of the patella.    Range of Motion   Extension: 10   Flexion: 110     Tests   Meniscus   Mariela:  Medial - negative Lateral - negative  Stability Lachman: normal (-1 to 2mm) PCL-Posterior Drawer: normal (0 to 2mm)  MCL - Valgus: normal (0 to 2mm)  LCL - Varus: normal (0 to 2mm)  Patella   Patellar apprehension: negative  Passive Patellar Tilt: neutral  Patellar Tracking: normal  Patellar Glide (Quadrants): Lateral - 1 Medial - 1    Other   Muscle Tightness: hamstring tightness  Sensation: normal    Comments:  Decreased patella mobility in all 4 planes.    Muscle Strength   Right Lower Extremity   Quadriceps:  5/5   Hamstrin/5    Left Lower Extremity   Quadriceps:  4/5   Hamstrin/5     Vascular Exam     Right Pulses  Dorsalis Pedis:      2+  Posterior Tibial:      2+        Left Pulses  Dorsalis Pedis:      2+  Posterior Tibial:      2+          Imaging:  X-rays of the left knee from 2022 personally reviewed by me on that day.  These include nonweightbearing AP and lateral.  Prior ACL reconstruction with Bio[employer] interference screw fixation.  The femoral tunnel looks adequate position.  The tibial tunnel is shallow and exit slightly anterior to the native ACL footprint.          Assessment:     Elizabeth Betancourt is a 17 y.o. female 3 months status post ACL reconstruction with quadriceps tendon autograft and lateral meniscus repair by Dr. King now with arthrofibrosis and stiffness of the left knee.  Encounter Diagnoses   Name Primary?    Arthrofibrosis of knee joint, left Yes    Rupture of anterior cruciate ligament of left knee, subsequent encounter           Plan:         The potential diagnoses and treatment options were discussed at length with the patient her mother and all their questions were answered.  We discussed possible treatments.  At this point obtain an MRI her knee to evaluate possible cyclops lesions and arthrofibrosis.  She will return to clinic after the MRI to discuss the findings and treatment options.  She will continue physical therapy in the meantime.    All of their questions were answered.  They will call the clinic with any questions or concerns in the interim.    Should the patient's symptoms worsen, persist, or fail to improve they should return for reevaluation and I would be happy to see them back anytime.        Anatoly Kirby M.D.     Please be aware that this note has been generated with the assistance of [facility] voice-to-text.  Please excuse any spelling or grammatical errors.    Thank you for choosing Dr. Anatoly Kirby for your sports medicine care. It is our goal to provide you with  This document is complete and the patient is ready for discharge. exceptional care that will help keep you healthy, active, and get you back in the game.     If you felt that you received exemplary care today, please consider leaving feedback for Dr. Kirby on [employer] at [email or web address].    Please do not hesitate to reach out to us via email, phone, or MyChart with any questions, concerns, or feedback.

## 2022-09-28 NOTE — DISCHARGE NOTE PROVIDER - NSDCFUSCHEDAPPT_GEN_ALL_CORE_FT
Holger Edwards  [facility] Physician [facility]  [address]  Scheduled Appointment: 10/13/2022

## 2022-09-28 NOTE — DISCHARGE NOTE NURSING/CASE MANAGEMENT/SOCIAL WORK - NSDCPEFALRISK_GEN_ALL_CORE
For information on Fall & Injury Prevention, visit: [email or web address] OR  [email or web address] OR  https://www.cdc.gov/steadi/patient.html

## 2022-09-28 NOTE — OCCUPATIONAL THERAPY INITIAL EVALUATION ADULT - GENERAL OBSERVATIONS, REHAB EVAL
Chart reviewed and event noted to date. Patient encountered supine in bed, NAD, A&Ox4, WBAT RLE s/p R TKA, +IV heplock, +dressing to L knee C/D/I, +JERMAN dressing. Patient's wife bedside.

## 2022-09-28 NOTE — DISCHARGE NOTE PROVIDER - NSDCMRMEDTOKEN_GEN_ALL_CORE_FT
amantadine 100 mg oral capsule: 1 cap(s) orally 2 times a day  Multiple Vitamins oral capsule: 1 cap(s) orally once a day  Please perform COVID PCR:   Qvar:   selegiline 5 mg oral tablet: 1 tab(s) orally 2 times a day  Sinemet 25 mg-100 mg oral tablet: 0.5 tab(s) orally 5 times a day  8am, 11am, 2pm, 5pm and 8pm   acetaminophen 325 mg oral tablet: 3 tab(s) orally every 8 hours, As needed, Mild Pain (1 - 3)  amantadine 100 mg oral capsule: 1 cap(s) orally 2 times a day  ascorbic acid 500 mg oral tablet: 1 tab(s) orally 2 times a day  Aspirin Enteric Coated 81 mg oral delayed release tablet: 1 tab(s) orally 2 times a day MDD:2  celecoxib 200 mg oral capsule: 1 cap(s) orally every 12 hours MDD:2  Multiple Vitamins oral capsule: 1 cap(s) orally once a day  Narcan 4 mg/0.1 mL nasal spray: 4 milligram(s) intranasally once, repeat as necessary.   As needed. For suspected opiate overdose   Follow instructions on packet MDD:0.2 ml  oxyCODONE 5 mg oral tablet: 1-2 tab(s) orally every 4 hours, As Needed -Pain MDD:6  pantoprazole 40 mg oral delayed release tablet: 1 tab(s) orally once a day (before a meal) MDD:1  polyethylene glycol 3350 oral powder for reconstitution: 17 gram(s) orally once a day (at bedtime)  Qvar: 1 application inhaled once a day, As Needed  selegiline 5 mg oral tablet: 1 tab(s) orally 2 times a day  senna leaf extract oral tablet: 2 tab(s) orally once a day (at bedtime)  Sinemet 25 mg-100 mg oral tablet: 0.5 tab(s) orally 5 times a day  8am, 11am, 2pm, 5pm and 8pm

## 2022-09-28 NOTE — PHYSICAL THERAPY INITIAL EVALUATION ADULT - GENERAL OBSERVATIONS, REHAB EVAL
Coordinated with Kavya . Patient received supine NAD. +JERMAN A x O 4. Able to follow 2 step commands.

## 2022-09-28 NOTE — PHYSICAL THERAPY INITIAL EVALUATION ADULT - ADDITIONAL COMMENTS
As per pt  : There are 6 steps, c far kanwal rails,  at the entry of the house and 3 steps, c R rail up, followed by a landing and another 12 steps, c R rail up, to negotiate at home. Pt has a tub/shower combo c fixed shower head, no grab bar, and regular toilet seat  in BR. 3-1 commode will fit in BR. Average pain level at rest is 6/10. Pain increases to 8/10 c bending, squat, and stairs negotiation. Pt take Naproxen, Motrin, Mobic, and apply topical ointment and perform stretch for pain management. Pt has no experience of adverse effects with pain medication. Pt is not on out-pt physical therapy at present. There is no h/o fall or knee buckling recently. Pt wears glasses for driving and at night and no need for hearing aid. Pt is R handed and drives.

## 2022-09-28 NOTE — ASU PREOP CHECKLIST - HAIR REMOVAL
Comprehensive Intake Entered On:  2/16/2019 10:29 AM CST    Performed On:  2/16/2019 10:26 AM CST by Kitty Lin RN               Summary   Chief Complaint :   Patient is here for possible strep. c/o severe sore throat, stiff neck, and fever/chills.   Ht/Wt Measurement Refused by Patient? :   Yes   Systolic Blood Pressure :   132 mmHg (HI)    Diastolic Blood Pressure :   84 mmHg (HI)    Mean Arterial Pressure :   100 mmHg   Peripheral Pulse Rate :   91 bpm   BP Site :   Right arm   BP Method :   Manual   HR Method :   Electronic   Temperature Tympanic :   98.7 DegF(Converted to: 37.1 DegC)    Oxygen Saturation :   96 %   Kitty Lin RN - 2/16/2019 10:26 AM CST   Health Status   Allergies Verified? :   Yes   Medication History Verified? :   Yes   Pre-Visit Planning Status :   N/A   Tobacco Use? :   Never smoker   Kitty Lin RN - 2/16/2019 10:26 AM CST   Consents   Consent for Immunization Exchange :   Consent Granted   Consent for Immunizations to Providers :   Consent Granted   Kitty Lin RN - 2/16/2019 10:26 AM CST   Meds / Allergies   (As Of: 2/16/2019 10:29:47 AM CST)   Allergies (Active)   sulfa drug  Estimated Onset Date:   Unspecified ; Created By:   Cristine Mccauley MA; Reaction Status:   Active ; Category:   Drug ; Substance:   sulfa drug ; Type:   Allergy ; Updated By:   Cristine Mccauley MA; Reviewed Date:   2/16/2019 10:28 AM CST        Medication List   (As Of: 2/16/2019 10:29:47 AM CST)   hair removal not indicated

## 2022-09-28 NOTE — ASU PREOP CHECKLIST - SITE MARKED BY SURGEON
continue amlodipine 5mg PO daily continue amlodipine 5mg PO daily continue amlodipine 5mg PO daily continue amlodipine 5mg PO daily n/a

## 2022-09-29 VITALS
TEMPERATURE: 98 F | HEART RATE: 72 BPM | RESPIRATION RATE: 17 BRPM | DIASTOLIC BLOOD PRESSURE: 80 MMHG | OXYGEN SATURATION: 98 % | SYSTOLIC BLOOD PRESSURE: 145 MMHG

## 2022-09-29 LAB
ANION GAP SERPL CALC-SCNC: 8 MMOL/L — SIGNIFICANT CHANGE UP (ref 5–17)
BUN SERPL-MCNC: 14 MG/DL — SIGNIFICANT CHANGE UP (ref 7–23)
CALCIUM SERPL-MCNC: 8.7 MG/DL — SIGNIFICANT CHANGE UP (ref 8.5–10.1)
CHLORIDE SERPL-SCNC: 107 MMOL/L — SIGNIFICANT CHANGE UP (ref 96–108)
CO2 SERPL-SCNC: 23 MMOL/L — SIGNIFICANT CHANGE UP (ref 22–31)
CREAT SERPL-MCNC: 0.99 MG/DL — SIGNIFICANT CHANGE UP (ref 0.5–1.3)
EGFR: 90 ML/MIN/1.73M2 — SIGNIFICANT CHANGE UP
GLUCOSE SERPL-MCNC: 102 MG/DL — HIGH (ref 70–99)
HCT VFR BLD CALC: 41.1 % — SIGNIFICANT CHANGE UP (ref 39–50)
HGB BLD-MCNC: 13.7 G/DL — SIGNIFICANT CHANGE UP (ref 13–17)
MCHC RBC-ENTMCNC: 31.1 PG — SIGNIFICANT CHANGE UP (ref 27–34)
MCHC RBC-ENTMCNC: 33.3 G/DL — SIGNIFICANT CHANGE UP (ref 32–36)
MCV RBC AUTO: 93.2 FL — SIGNIFICANT CHANGE UP (ref 80–100)
NRBC # BLD: 0 /100 WBCS — SIGNIFICANT CHANGE UP (ref 0–0)
PLATELET # BLD AUTO: 302 K/UL — SIGNIFICANT CHANGE UP (ref 150–400)
POTASSIUM SERPL-MCNC: 3.8 MMOL/L — SIGNIFICANT CHANGE UP (ref 3.5–5.3)
POTASSIUM SERPL-SCNC: 3.8 MMOL/L — SIGNIFICANT CHANGE UP (ref 3.5–5.3)
RBC # BLD: 4.41 M/UL — SIGNIFICANT CHANGE UP (ref 4.2–5.8)
RBC # FLD: 12.9 % — SIGNIFICANT CHANGE UP (ref 10.3–14.5)
SODIUM SERPL-SCNC: 138 MMOL/L — SIGNIFICANT CHANGE UP (ref 135–145)
WBC # BLD: 8.94 K/UL — SIGNIFICANT CHANGE UP (ref 3.8–10.5)
WBC # FLD AUTO: 8.94 K/UL — SIGNIFICANT CHANGE UP (ref 3.8–10.5)

## 2022-09-29 RX ORDER — NALOXONE HYDROCHLORIDE 4 MG/.1ML
4 SPRAY NASAL
Qty: 1 | Refills: 0
Start: 2022-09-29 | End: 2022-09-29

## 2022-09-29 RX ORDER — BECLOMETHASONE DIPROPIONATE 40 UG/1
0 AEROSOL, METERED RESPIRATORY (INHALATION)
Qty: 0 | Refills: 0 | DISCHARGE

## 2022-09-29 RX ORDER — CELECOXIB 200 MG/1
1 CAPSULE ORAL
Qty: 60 | Refills: 0
Start: 2022-09-29 | End: 2022-10-28

## 2022-09-29 RX ORDER — BECLOMETHASONE DIPROPIONATE 40 UG/1
1 AEROSOL, METERED RESPIRATORY (INHALATION)
Qty: 0 | Refills: 0 | DISCHARGE

## 2022-09-29 RX ORDER — ASPIRIN/CALCIUM CARB/MAGNESIUM 324 MG
1 TABLET ORAL
Qty: 60 | Refills: 0
Start: 2022-09-29 | End: 2022-10-28

## 2022-09-29 RX ORDER — ACETAMINOPHEN 500 MG
3 TABLET ORAL
Qty: 0 | Refills: 0 | DISCHARGE
Start: 2022-09-29

## 2022-09-29 RX ORDER — ASCORBIC ACID 60 MG
1 TABLET,CHEWABLE ORAL
Qty: 0 | Refills: 0 | DISCHARGE
Start: 2022-09-29

## 2022-09-29 RX ORDER — OXYCODONE HYDROCHLORIDE 5 MG/1
1 TABLET ORAL
Qty: 42 | Refills: 0
Start: 2022-09-29 | End: 2022-10-05

## 2022-09-29 RX ORDER — POLYETHYLENE GLYCOL 3350 17 G/17G
17 POWDER, FOR SOLUTION ORAL
Qty: 0 | Refills: 0 | DISCHARGE
Start: 2022-09-29

## 2022-09-29 RX ORDER — SENNA PLUS 8.6 MG/1
2 TABLET ORAL
Qty: 0 | Refills: 0 | DISCHARGE
Start: 2022-09-29

## 2022-09-29 RX ORDER — PANTOPRAZOLE SODIUM 20 MG/1
1 TABLET, DELAYED RELEASE ORAL
Qty: 30 | Refills: 0
Start: 2022-09-29 | End: 2022-10-28

## 2022-09-29 RX ADMIN — Medication 102 MILLIGRAM(S): at 06:39

## 2022-09-29 RX ADMIN — OXYCODONE HYDROCHLORIDE 10 MILLIGRAM(S): 5 TABLET ORAL at 12:48

## 2022-09-29 RX ADMIN — Medication 500 MILLIGRAM(S): at 06:40

## 2022-09-29 RX ADMIN — OXYCODONE HYDROCHLORIDE 10 MILLIGRAM(S): 5 TABLET ORAL at 13:30

## 2022-09-29 RX ADMIN — SELEGILINE HYDROCHLORIDE 5 MILLIGRAM(S): 1.25 TABLET, ORALLY DISINTEGRATING ORAL at 08:10

## 2022-09-29 RX ADMIN — Medication 1 TABLET(S): at 11:55

## 2022-09-29 RX ADMIN — OXYCODONE HYDROCHLORIDE 10 MILLIGRAM(S): 5 TABLET ORAL at 06:40

## 2022-09-29 RX ADMIN — Medication 100 MILLIGRAM(S): at 08:10

## 2022-09-29 RX ADMIN — CELECOXIB 200 MILLIGRAM(S): 200 CAPSULE ORAL at 06:40

## 2022-09-29 RX ADMIN — Medication 100 MILLIGRAM(S): at 03:19

## 2022-09-29 RX ADMIN — CARBIDOPA AND LEVODOPA 0.5 TABLET(S): 25; 100 TABLET ORAL at 14:03

## 2022-09-29 RX ADMIN — OXYCODONE HYDROCHLORIDE 10 MILLIGRAM(S): 5 TABLET ORAL at 07:40

## 2022-09-29 RX ADMIN — CARBIDOPA AND LEVODOPA 0.5 TABLET(S): 25; 100 TABLET ORAL at 11:02

## 2022-09-29 RX ADMIN — SODIUM CHLORIDE 125 MILLILITER(S): 9 INJECTION, SOLUTION INTRAVENOUS at 06:39

## 2022-09-29 RX ADMIN — PANTOPRAZOLE SODIUM 40 MILLIGRAM(S): 20 TABLET, DELAYED RELEASE ORAL at 06:40

## 2022-09-29 RX ADMIN — CELECOXIB 200 MILLIGRAM(S): 200 CAPSULE ORAL at 07:40

## 2022-09-29 RX ADMIN — Medication 81 MILLIGRAM(S): at 06:40

## 2022-09-29 RX ADMIN — CARBIDOPA AND LEVODOPA 0.5 TABLET(S): 25; 100 TABLET ORAL at 08:09

## 2022-09-29 NOTE — PROGRESS NOTE ADULT - SUBJECTIVE AND OBJECTIVE BOX
CAROLIN CARTWRIGHT is a 56y Male s/p RIGHT TOTAL KNEE REPLACEMENT        denies any chest pain shortness of breath palpitation dizziness lightheadedness nausea vomiting fever or chills    T(C): 36.8 (09-29-22 @ 13:00), Max: 36.8 (09-29-22 @ 03:51)  HR: 72 (09-29-22 @ 13:00) (55 - 74)  BP: 145/80 (09-29-22 @ 13:00) (114/74 - 145/80)  RR: 17 (09-29-22 @ 13:00) (17 - 18)  SpO2: 98% (09-29-22 @ 13:00) (95% - 99%)  no jvd/bruit  s1 s2 rrr  cta  s/nt/nd  no calf tend                        13.7   8.94  )-----------( 302      ( 29 Sep 2022 06:00 )             41.1   09-29    138  |  107  |  14  ----------------------------<  102<H>  3.8   |  23  |  0.99    Ca    8.7      29 Sep 2022 06:00        cont dvt px  pain control  bowel regimen  antiemetics  incentive spirometer
Patient is seen and examined at bedside. Denies CP/SOB/Dizziness/N/V/D/HA. Pain is controlled.     Vital Signs Last 24 Hrs  T(C): 36.7 (29 Sep 2022 07:51), Max: 36.9 (28 Sep 2022 14:29)  T(F): 98 (29 Sep 2022 07:51), Max: 98.5 (28 Sep 2022 15:28)  HR: 65 (29 Sep 2022 07:51) (55 - 74)  BP: 138/79 (29 Sep 2022 07:51) (113/77 - 144/81)  BP(mean): --  RR: 18 (29 Sep 2022 07:51) (12 - 20)  SpO2: 96% (29 Sep 2022 07:51) (94% - 100%)    Parameters below as of 29 Sep 2022 07:51  Patient On (Oxygen Delivery Method): room air          PHYSICAL EXAM:  General: NAD, WDWN.   Neuro:  Alert & responsive  HEENT: NCAT, EOMI, conjunctiva clear  abd: soft, NT/ND  Right LE: JERMAN dressing C/D/I. Battery flashing green/ok. Motor intact + EHL/FHL/TA/GS.  Sensation is grossly intact.  Extremity warm, compartments soft, compressible. No calf tenderness. DP 2+   Left LE: Motor intact +EHL/FHL/TA/GS. Sensation is grossly intact. Extremity warm, compartments soft, compressible. No calf tenderness. DP2+    Labs:                          13.7   8.94  )-----------( 302      ( 29 Sep 2022 06:00 )             41.1       09-29    138  |  107  |  14  ----------------------------<  102<H>  3.8   |  23  |  0.99    Ca    8.7      29 Sep 2022 06:00        A/P: Patient is a 56y y/o Male s/p right TKA, POD # 1  -wound care, knee extension/leg elevation, cryocuff, isometric exercises, new medications reviewed with pt  -Pain control/analgesia reviewed   -Inc spirometry reviewed with pt, demonstrated competence  -DVT prophylaxis with Venodynes/Aspirin 81mg BID  -PT/OT/WBAT  -medical consult reviewed   -DC planning: home with home care   -D/W Dr Edwards      
Patient is 56y y/o Male s/p R TKA POD#0  Patient is seen and examined at bedside.   Pt tolerated procedure well without any intra-op complications.    Pain is controlled.  Denies CP/SOB/Dizziness/N/V/D/HA.     Vital Signs Last 24 Hrs  T(C): 36.4 (28 Sep 2022 16:28), Max: 36.9 (28 Sep 2022 14:29)  T(F): 97.6 (28 Sep 2022 16:28), Max: 98.5 (28 Sep 2022 15:28)  HR: 65 (28 Sep 2022 16:28) (60 - 71)  BP: 124/78 (28 Sep 2022 16:28) (113/77 - 143/92)  BP(mean): --  RR: 18 (28 Sep 2022 16:28) (12 - 20)  SpO2: 96% (28 Sep 2022 16:28) (94% - 100%)    Parameters below as of 28 Sep 2022 16:28  Patient On (Oxygen Delivery Method): room air          PHYSICAL EXAM:  General: A&Ox3 NAD  RLE: Dressing C/D/I with ACE wrap in place. Motor intact + EHL/FHL/TA/GS.  Sensation is grossly intact.  Extremity warm, compartments soft, compressible. No calf tenderness. DP 2+   LLE: Motor intact +EHL/FHL/TA/GS. Sensation is grossly intact. Extremity warm, compartments soft, compressible. No calf tenderness. DP2+    Labs:                          15.5   14.73 )-----------( 333      ( 28 Sep 2022 14:10 )             48.2       09-28    136  |  107  |  14  ----------------------------<  102<H>  4.4   |  23  |  0.97    Ca    8.8      28 Sep 2022 14:10        A/P: Patient is a 56y y/o Male s/p R TKA, POD #0   -wound care, knee extension/leg elevation, cryocuff, isometric exercises, new medications reviewed with pt  -Pain control/analgesia  -Inc spirometry reviewed with pt, demonstrated competence  -DVT prophylaxis with Venodynes/Aspirin 81 BID  -F/U AM Labs  -PT/OT/WBAT  -prophylactic Antibiotic  -medical consult  -DC planning

## 2022-10-01 DIAGNOSIS — M17.11 UNILATERAL PRIMARY OSTEOARTHRITIS, RIGHT KNEE: ICD-10-CM

## 2022-10-13 ENCOUNTER — APPOINTMENT (OUTPATIENT)
Dept: ORTHOPEDIC SURGERY | Facility: CLINIC | Age: 57
End: 2022-10-13

## 2022-10-13 PROBLEM — Z86.69 PERSONAL HISTORY OF OTHER DISEASES OF THE NERVOUS SYSTEM AND SENSE ORGANS: Chronic | Status: ACTIVE | Noted: 2022-09-08

## 2022-10-13 PROBLEM — Z88.9 ALLERGY STATUS TO UNSPECIFIED DRUGS, MEDICAMENTS AND BIOLOGICAL SUBSTANCES: Chronic | Status: ACTIVE | Noted: 2022-09-08

## 2022-10-13 PROCEDURE — 73562 X-RAY EXAM OF KNEE 3: CPT | Mod: RT

## 2022-10-13 PROCEDURE — 99024 POSTOP FOLLOW-UP VISIT: CPT

## 2022-10-13 NOTE — PHYSICAL EXAM
[Right] : right knee [] : uses walker [TWNoteComboBox7] : flexion 120 degrees [de-identified] : extension 0 degrees

## 2022-10-13 NOTE — IMAGING
[Right] : right knee [AP] : anteroposterior [Lateral] : lateral [[address]] : skyline [Components well fixed, in good position] : Components well fixed, in good position

## 2022-10-13 NOTE — HISTORY OF PRESENT ILLNESS
[Right Leg] : right leg [1] : 2 [0] : 0 [Dull/Aching] : dull/aching [Occasional] : occasional [Rest] : rest [Walking] : walking [] : yes [de-identified] : 10/13/22 HERE FOR 1ST POST OP VISIT S/P RIGHT TKA DONE ON 09/28/22 DOING WELL [FreeTextEntry1] : KNEE [de-identified] : SX

## 2022-10-13 NOTE — ASSESSMENT
[FreeTextEntry1] : S/P RT TKA 9/28/22: NO F/C/S. DOING WELL. XRAYS REVIEWED WITH COMPONENTS WELL FIXED. NO SIGNS OF INFECTION. GOOD LIGAMENT BALANCE ON EXAM.  DISCUSSED THE IMPORTANCE OF ELEVATION TO REDUCE SWELLING. PROPER ELEVATION TECHNIQUES DISCUSSED. ABX AND PRECAUTIONS REVIEWED. PT RX. QUESTIONS ANSWERED.

## 2022-11-29 ENCOUNTER — APPOINTMENT (OUTPATIENT)
Dept: ORTHOPEDIC SURGERY | Facility: CLINIC | Age: 57
End: 2022-11-29

## 2022-11-29 PROCEDURE — 99024 POSTOP FOLLOW-UP VISIT: CPT

## 2022-11-29 RX ORDER — AMOXICILLIN 500 MG/1
500 TABLET, FILM COATED ORAL
Qty: 20 | Refills: 0 | Status: ACTIVE | COMMUNITY
Start: 2022-11-29 | End: 1900-01-01

## 2022-11-29 RX ORDER — NAPROXEN 500 MG/1
500 TABLET ORAL
Qty: 60 | Refills: 1 | Status: ACTIVE | COMMUNITY
Start: 2022-11-29 | End: 1900-01-01

## 2022-11-29 NOTE — ASSESSMENT
[FreeTextEntry1] : S/P RT TKA 9/28/22\par DOING GREAT. WILL RENEW PT NO PASSIVE FLEXION. AMOXIL FOR DENTIST IN JAN AND ZULLY PRN PAIN. FOLLOW UP IN 3 MONS WITH XRAYS. PT RX.

## 2022-11-29 NOTE — PHYSICAL EXAM
LOV 5/4/21   NOV  1/5/22  Labs    5/4/22  Last RF 5/3/21   [Right] : right knee [] : patient ambulates without assistive device [TWNoteComboBox7] : flexion 130 degrees [de-identified] : extension 0 degrees

## 2022-11-29 NOTE — HISTORY OF PRESENT ILLNESS
[Right Leg] : right leg [1] : 2 [0] : 0 [Dull/Aching] : dull/aching [Rest] : rest [Walking] : walking [] : yes [Localized] : localized [Tightness] : tightness [Intermittent] : intermittent [de-identified] : 10/13/22 HERE FOR 1ST POST OP VISIT S/P RIGHT TKA DONE ON 09/28/22 DOING WELL\par \par 11/ 29/22 second post op right tka done on 09/28/22 doing well ,cont physical therapy [FreeTextEntry1] : KNEE [de-identified] : physical therapy

## 2023-01-26 ENCOUNTER — APPOINTMENT (OUTPATIENT)
Dept: ORTHOPEDIC SURGERY | Facility: CLINIC | Age: 58
End: 2023-01-26
Payer: COMMERCIAL

## 2023-01-26 VITALS — BODY MASS INDEX: 24.99 KG/M2 | HEIGHT: 65 IN | WEIGHT: 150 LBS

## 2023-01-26 DIAGNOSIS — G20 PARKINSON'S DISEASE: ICD-10-CM

## 2023-01-26 DIAGNOSIS — M54.16 RADICULOPATHY, LUMBAR REGION: ICD-10-CM

## 2023-01-26 DIAGNOSIS — M51.26 OTHER INTERVERTEBRAL DISC DISPLACEMENT, LUMBAR REGION: ICD-10-CM

## 2023-01-26 DIAGNOSIS — M47.816 SPONDYLOSIS W/OUT MYELOPATHY OR RADICULOPATHY, LUMBAR REGION: ICD-10-CM

## 2023-01-26 PROCEDURE — 99214 OFFICE O/P EST MOD 30 MIN: CPT

## 2023-01-26 PROCEDURE — 72110 X-RAY EXAM L-2 SPINE 4/>VWS: CPT

## 2023-01-26 PROCEDURE — 72170 X-RAY EXAM OF PELVIS: CPT

## 2023-01-26 RX ORDER — TIZANIDINE HYDROCHLORIDE 2 MG/1
2 CAPSULE ORAL
Qty: 40 | Refills: 0 | Status: ACTIVE | COMMUNITY
Start: 2023-01-26 | End: 1900-01-01

## 2023-01-26 RX ORDER — METHYLPREDNISOLONE 4 MG/1
4 TABLET ORAL
Qty: 1 | Refills: 1 | Status: ACTIVE | COMMUNITY
Start: 2023-01-26 | End: 1900-01-01

## 2023-01-26 NOTE — DISCUSSION/SUMMARY
[de-identified] : reviewed the case/imaging with him \par disucssion of tx options \par MDP/TIZANIDINE \par if not getting would rec he get an MRI OF THE l SPINE \par Oow TOMORROW NOTE

## 2023-02-28 ENCOUNTER — APPOINTMENT (OUTPATIENT)
Dept: ORTHOPEDIC SURGERY | Facility: CLINIC | Age: 58
End: 2023-02-28
Payer: COMMERCIAL

## 2023-02-28 VITALS — HEIGHT: 65 IN | BODY MASS INDEX: 24.99 KG/M2 | WEIGHT: 150 LBS

## 2023-02-28 DIAGNOSIS — Z96.651 PRESENCE OF RIGHT ARTIFICIAL KNEE JOINT: ICD-10-CM

## 2023-02-28 PROCEDURE — 73562 X-RAY EXAM OF KNEE 3: CPT | Mod: RT

## 2023-02-28 PROCEDURE — 99214 OFFICE O/P EST MOD 30 MIN: CPT

## 2023-02-28 RX ORDER — IBUPROFEN 600 MG/1
600 TABLET, FILM COATED ORAL 3 TIMES DAILY
Qty: 60 | Refills: 0 | Status: ACTIVE | COMMUNITY
Start: 2023-02-28 | End: 1900-01-01

## 2023-02-28 NOTE — PHYSICAL EXAM
[Right] : right knee [] : no tenderness [TWNoteComboBox7] : flexion 130 degrees [de-identified] : extension 0 degrees

## 2023-02-28 NOTE — ASSESSMENT
[FreeTextEntry1] : S/P RT TKA 9/28/22: DOING GREAT. XRAYS REVIEWED WITH COMPONENTS WELL FIXED. NO SIGNS OF INFECTION. GOOD LIGAMENT BALANCE ON EXAM. ABX AND PRECAUTIONS AGAIN REVIEWED. QUESTIONS ANSWERED.

## 2023-02-28 NOTE — HISTORY OF PRESENT ILLNESS
[Right Leg] : right leg [1] : 2 [0] : 0 [Dull/Aching] : dull/aching [Localized] : localized [Tightness] : tightness [Intermittent] : intermittent [Rest] : rest [Walking] : walking [] : yes [de-identified] : 10/13/22 HERE FOR 1ST POST OP VISIT S/P RIGHT TKA DONE ON 09/28/22 DOING WELL\par \par 11/ 29/22 second post op right tka done on 09/28/22 doing well ,cont physical therapy\par \par 02/28/23 here for a follow up s/p right tka doing well  [FreeTextEntry1] : KNEE [de-identified] : physical therapy

## 2023-07-28 ENCOUNTER — APPOINTMENT (OUTPATIENT)
Dept: FAMILY MEDICINE | Facility: CLINIC | Age: 58
End: 2023-07-28

## 2024-04-16 ENCOUNTER — APPOINTMENT (OUTPATIENT)
Dept: RADIOLOGY | Facility: CLINIC | Age: 59
End: 2024-04-16

## 2024-04-16 ENCOUNTER — APPOINTMENT (OUTPATIENT)
Dept: MRI IMAGING | Facility: CLINIC | Age: 59
End: 2024-04-16

## 2024-04-25 ENCOUNTER — APPOINTMENT (OUTPATIENT)
Dept: ORTHOPEDIC SURGERY | Facility: CLINIC | Age: 59
End: 2024-04-25

## 2024-11-26 ENCOUNTER — NON-APPOINTMENT (OUTPATIENT)
Age: 59
End: 2024-11-26

## 2024-12-05 ENCOUNTER — APPOINTMENT (OUTPATIENT)
Dept: OTOLARYNGOLOGY | Facility: CLINIC | Age: 59
End: 2024-12-05
Payer: MEDICARE

## 2024-12-05 PROCEDURE — 92520 LARYNGEAL FUNCTION STUDIES: CPT | Mod: GN

## 2024-12-05 PROCEDURE — 92524 BEHAVRAL QUALIT ANALYS VOICE: CPT | Mod: GN

## 2024-12-12 ENCOUNTER — APPOINTMENT (OUTPATIENT)
Dept: OTOLARYNGOLOGY | Facility: CLINIC | Age: 59
End: 2024-12-12

## 2025-01-03 ENCOUNTER — APPOINTMENT (OUTPATIENT)
Dept: OTOLARYNGOLOGY | Facility: CLINIC | Age: 60
End: 2025-01-03
Payer: MEDICARE

## 2025-01-03 PROCEDURE — 92507 TX SP LANG VOICE COMM INDIV: CPT | Mod: GN

## 2025-01-10 ENCOUNTER — APPOINTMENT (OUTPATIENT)
Dept: OTOLARYNGOLOGY | Facility: CLINIC | Age: 60
End: 2025-01-10

## 2025-01-17 ENCOUNTER — APPOINTMENT (OUTPATIENT)
Dept: OTOLARYNGOLOGY | Facility: CLINIC | Age: 60
End: 2025-01-17
Payer: MEDICARE

## 2025-01-17 ENCOUNTER — APPOINTMENT (OUTPATIENT)
Dept: OTOLARYNGOLOGY | Facility: CLINIC | Age: 60
End: 2025-01-17

## 2025-01-17 PROCEDURE — 92507 TX SP LANG VOICE COMM INDIV: CPT | Mod: GN

## 2025-01-24 ENCOUNTER — APPOINTMENT (OUTPATIENT)
Dept: OTOLARYNGOLOGY | Facility: CLINIC | Age: 60
End: 2025-01-24
Payer: MEDICARE

## 2025-01-24 PROCEDURE — 92507 TX SP LANG VOICE COMM INDIV: CPT | Mod: GN

## 2025-01-31 ENCOUNTER — APPOINTMENT (OUTPATIENT)
Dept: OTOLARYNGOLOGY | Facility: CLINIC | Age: 60
End: 2025-01-31

## 2025-02-05 ENCOUNTER — APPOINTMENT (OUTPATIENT)
Dept: OTOLARYNGOLOGY | Facility: CLINIC | Age: 60
End: 2025-02-05

## 2025-02-12 ENCOUNTER — APPOINTMENT (OUTPATIENT)
Dept: OTOLARYNGOLOGY | Facility: CLINIC | Age: 60
End: 2025-02-12

## 2025-02-19 ENCOUNTER — APPOINTMENT (OUTPATIENT)
Dept: OTOLARYNGOLOGY | Facility: CLINIC | Age: 60
End: 2025-02-19

## 2025-02-26 ENCOUNTER — APPOINTMENT (OUTPATIENT)
Dept: OTOLARYNGOLOGY | Facility: CLINIC | Age: 60
End: 2025-02-26

## 2025-02-27 ENCOUNTER — APPOINTMENT (OUTPATIENT)
Dept: RADIOLOGY | Facility: HOSPITAL | Age: 60
End: 2025-02-27

## 2025-02-27 ENCOUNTER — APPOINTMENT (OUTPATIENT)
Dept: SPEECH THERAPY | Facility: HOSPITAL | Age: 60
End: 2025-02-27

## 2025-03-05 ENCOUNTER — APPOINTMENT (OUTPATIENT)
Dept: OTOLARYNGOLOGY | Facility: CLINIC | Age: 60
End: 2025-03-05

## 2025-04-24 ENCOUNTER — APPOINTMENT (OUTPATIENT)
Dept: ORTHOPEDIC SURGERY | Facility: CLINIC | Age: 60
End: 2025-04-24

## (undated) DEVICE — WARMING BLANKET UPPER ADULT

## (undated) DEVICE — STRYKER MIXEVAC 3 BONE CEMENT MIXER

## (undated) DEVICE — DRAPE STERI-DRAPE INCISE 19X17"

## (undated) DEVICE — DRSG STOCKINETTE TUBULAR COTTON 1PLY 6X72"

## (undated) DEVICE — MARKING PEN W RULER

## (undated) DEVICE — PREP BETADINE KIT

## (undated) DEVICE — PREP CHLORAPREP HI-LITE ORANGE 26ML

## (undated) DEVICE — SYR LUER LOK 20CC

## (undated) DEVICE — SUT VICRYL 1 36" CTX UNDYED

## (undated) DEVICE — GLV 8 PROTEXIS (BLUE)

## (undated) DEVICE — TRAY IRR SYR PISTON

## (undated) DEVICE — HOOD T5 PEELAWAY

## (undated) DEVICE — CRYO/CUFF GRAVITY COOLER KNEE LARGE

## (undated) DEVICE — DRAPE STERI-DRAPE INCISE 32X33"

## (undated) DEVICE — DRAPE EXTREMITY 87" X 128.5"

## (undated) DEVICE — SUT VICRYL 2-0 36" CT-1 UNDYED

## (undated) DEVICE — MIXER BONE CEMENT EVAC III

## (undated) DEVICE — SOL IRR BAG NS 0.9% 3000ML

## (undated) DEVICE — SAW BLADE STRYKER SAGITTAL DUAL CUT 18X90X1.19MM

## (undated) DEVICE — DRSG ACE BANDAGE 6"

## (undated) DEVICE — SAW BLADE STRYKER SAGITTAL PERFORMANCE SERIES 25X1.19X90MM

## (undated) DEVICE — NDL HYPO SAFE 20G X 1.5" (YELLOW)

## (undated) DEVICE — DRSG PICO NPWT 4X12"

## (undated) DEVICE — SAW BLADE STRYKER SAGITTAL 25X0.89X75MM

## (undated) DEVICE — PACK TOTAL JOINT

## (undated) DEVICE — SUT VLOC 90 4-0 18" P-12 UNDYED

## (undated) DEVICE — PACK BASIC

## (undated) DEVICE — GLV 8 PROTEXIS (WHITE)

## (undated) DEVICE — FRA-ESU BOVIE FORCE TRIAD T7J19717DX: Type: DURABLE MEDICAL EQUIPMENT

## (undated) DEVICE — ZIMMER PULSAVAC PLUS FAN KIT

## (undated) DEVICE — POSITIONER FOAM BUMP FLAT TOP 10X6X4" LRG

## (undated) DEVICE — DRAPE INSTRUMENT POUCH 6.75" X 11"

## (undated) DEVICE — DRAPE 3/4 SHEET W REINFORCEMENT 56X77"

## (undated) DEVICE — DRSG WEBRIL 6"

## (undated) DEVICE — SUCTION YANKAUER TAPERED BULBOUS NO VENT

## (undated) DEVICE — GOWN XXL